# Patient Record
Sex: MALE | Race: ASIAN | NOT HISPANIC OR LATINO | URBAN - METROPOLITAN AREA
[De-identification: names, ages, dates, MRNs, and addresses within clinical notes are randomized per-mention and may not be internally consistent; named-entity substitution may affect disease eponyms.]

---

## 2017-09-04 ENCOUNTER — EMERGENCY (EMERGENCY)
Facility: HOSPITAL | Age: 42
LOS: 1 days | Discharge: HOME | End: 2017-09-04
Attending: EMERGENCY MEDICINE | Admitting: EMERGENCY MEDICINE
Payer: SELF-PAY

## 2017-09-04 VITALS
HEART RATE: 74 BPM | DIASTOLIC BLOOD PRESSURE: 77 MMHG | OXYGEN SATURATION: 96 % | TEMPERATURE: 98 F | RESPIRATION RATE: 16 BRPM | SYSTOLIC BLOOD PRESSURE: 110 MMHG

## 2017-09-04 VITALS
TEMPERATURE: 98 F | DIASTOLIC BLOOD PRESSURE: 74 MMHG | HEART RATE: 62 BPM | SYSTOLIC BLOOD PRESSURE: 101 MMHG | RESPIRATION RATE: 18 BRPM | OXYGEN SATURATION: 98 %

## 2017-09-04 PROCEDURE — 99283 EMERGENCY DEPT VISIT LOW MDM: CPT | Mod: 25

## 2017-09-04 PROCEDURE — 99053 MED SERV 10PM-8AM 24 HR FAC: CPT

## 2017-09-04 NOTE — ED PROVIDER NOTE - MEDICAL DECISION MAKING DETAILS
41M BIBEMS for EtOH intoxication.  Currently still acutely intoxicated.  Will observe and provide supportive care PRN.

## 2017-09-04 NOTE — ED ADULT NURSE REASSESSMENT NOTE - NS ED NURSE REASSESS COMMENT FT1
Patient money brought to security. Belongings placed in front of room 21. Patient money brought to security. Belongings placed in front of room 21. Unknown white grainy substance in small bottle found in patient's belongings. Security and ANM made aware. Security disposed of substance.

## 2017-09-04 NOTE — ED PROVIDER NOTE - NS ED ROS FT
limited by patient limited by patient's inebriation; endorsing reproducible chest pain and R hallux pain

## 2017-09-04 NOTE — ED PROVIDER NOTE - PROGRESS NOTE DETAILS
Sleeping but arousable. More awake but still intox. complains of chest pain which is reproducible with palpation. EKG was obtained without any ischemic changes. NSR otherwise. Found to have white powder in pocket, security called, they determined not cocaine or heroin. Drank a cup of water without issue. Still tired and not wanting to walk or leave. Will continue observation. ED Attending Khurram Mcmahon MD,   40y/o male signed out to me with C/C of ETOH use to be discharged when awake.  Pt now wakes up easily. No other complaints. Gollogly: Pt signed out to me. Patient reassessed: No complaints, vital signs normal, resting comfortably, A&Ox3, speech clear, gait normal. Pt is clinically sober.

## 2017-09-04 NOTE — ED PROVIDER NOTE - ATTENDING CONTRIBUTION TO CARE
DR. RAMIREZ, ATTENDING MD-  I performed a face to face bedside interview with patient regarding history of present illness, review of symptoms and past medical history. I completed an independent physical exam.  I have discussed patient's plan of care with the resident.   Documentation as above in the note.   HPI: 42 yo M with unknown Past Medical History that presents with alcohol intoxication, was found by EMS in [facility], admits to drinking alcohol tonight, denies any other ingestions. Denies fall, trauma, fever, chills.   EXAM: Intoxicated, head atraumatic, neck non tender, supple, FROM, no stepoffs, heart RRR, no M/R/G, chest wall normal and stable, no clavicular Fx, FROM in all 4 ext all warm and well perfused, pulses palpable and equal, abd soft nontender.   MDM: Most likely intox per pt report and benign physical exam. Will observe pending sobriety.

## 2017-09-04 NOTE — ED PROVIDER NOTE - OBJECTIVE STATEMENT
41M BIBEMS for EtOH intoxication after being found in a laundromat.  Patient complaining of chest pain that is aggravated by palpation and of a chronic, 3 year R hallux ulcer that is causing him pain.  Patient otherwise emotionally labile, alternating between laughing and crying and unable to provide history.  Had endorsed drinking 3 shots earlier today.

## 2017-09-04 NOTE — ED ADULT NURSE NOTE - OBJECTIVE STATEMENT
[facility] t[facility] ED R[facility]20 intoxicated[facility]. Pt admits to drinking 3 shots, not answering other questions, responding inappropriately to questions. In NAD, respirations even and unlabored. VSS. Urinal provided. Awaiting MD barber. Will continue to monitor. Pt presents tO ED R#20 intoxicated, brought in from [facility] where found on floor. Pt admits to drinking 3 shots, not answering other questions, responding inappropriately to questions. In NAD, respirations even and unlabored. VSS. Urinal provided. Awaiting MD barber. Will continue to monitor.  Received report from PM RN. Patient is resting comfortably with no complaints. Pt reevaluated by MD and discharged. Pt verbalizes understanding of discharge instructions. Belongings returned from cabinet by 21 and cash received from security. Pt has taken possession of all property and has left the area.   PANCHO Parker

## 2017-09-04 NOTE — ED PROVIDER NOTE - PHYSICAL EXAMINATION
PHYSICAL EXAM  GENERAL: NAD, thin, poor hygiene  HEAD:  Atraumatic, Normocephalic  EYES: EOMI, PERRLA, conjunctiva and sclera clear, no nystagmus  MOUTH: No tongue fasciculations  NECK: Supple, No JVD  CHEST/LUNG: Clear to auscultation bilaterally; No wheeze  HEART: Regular rate and rhythm; No murmurs, rubs, or gallops  ABDOMEN: Soft, Nontender, Nondistended; Bowel sounds present  EXTREMITIES:  B/L UE tremors, hips stable; 2+ Peripheral Pulses, No clubbing, cyanosis, or edema  PSYCH: labile  SKIN: proximal R hallux dry ulcer with TTP; No rashes

## 2017-09-04 NOTE — ED ADULT TRIAGE NOTE - CHIEF COMPLAINT QUOTE
Pt arrives to ED via EMS found pt in a laundromat.  Pt unsteady on feet.  Pt admits drinking this evening.  Pt tearful and then laughing in triage.  Pt pulled his pants down to begin urinating in the stretcher, urinal provided.

## 2018-04-04 ENCOUNTER — EMERGENCY (EMERGENCY)
Facility: HOSPITAL | Age: 43
LOS: 1 days | Discharge: HOME | End: 2018-04-04
Attending: EMERGENCY MEDICINE | Admitting: EMERGENCY MEDICINE
Payer: MEDICAID

## 2018-04-04 VITALS
TEMPERATURE: 98 F | RESPIRATION RATE: 18 BRPM | HEART RATE: 73 BPM | SYSTOLIC BLOOD PRESSURE: 123 MMHG | OXYGEN SATURATION: 98 % | DIASTOLIC BLOOD PRESSURE: 65 MMHG

## 2018-04-04 LAB
ALBUMIN SERPL ELPH-MCNC: 4.2 G/DL — SIGNIFICANT CHANGE UP (ref 3.3–5)
ALP SERPL-CCNC: 107 U/L — SIGNIFICANT CHANGE UP (ref 40–120)
ALT FLD-CCNC: 18 U/L — SIGNIFICANT CHANGE UP (ref 4–41)
APAP SERPL-MCNC: < 15 UG/ML — LOW (ref 15–25)
AST SERPL-CCNC: 29 U/L — SIGNIFICANT CHANGE UP (ref 4–40)
BASE EXCESS BLDV CALC-SCNC: 0.6 MMOL/L — SIGNIFICANT CHANGE UP
BASOPHILS # BLD AUTO: 0.07 K/UL — SIGNIFICANT CHANGE UP (ref 0–0.2)
BASOPHILS NFR BLD AUTO: 1.2 % — SIGNIFICANT CHANGE UP (ref 0–2)
BILIRUB SERPL-MCNC: < 0.2 MG/DL — LOW (ref 0.2–1.2)
BLOOD GAS VENOUS - CREATININE: 0.92 MG/DL — SIGNIFICANT CHANGE UP (ref 0.5–1.3)
BUN SERPL-MCNC: 18 MG/DL — SIGNIFICANT CHANGE UP (ref 7–23)
CALCIUM SERPL-MCNC: 8.7 MG/DL — SIGNIFICANT CHANGE UP (ref 8.4–10.5)
CHLORIDE BLDV-SCNC: 113 MMOL/L — HIGH (ref 96–108)
CHLORIDE SERPL-SCNC: 105 MMOL/L — SIGNIFICANT CHANGE UP (ref 98–107)
CK SERPL-CCNC: 300 U/L — HIGH (ref 30–200)
CO2 SERPL-SCNC: 25 MMOL/L — SIGNIFICANT CHANGE UP (ref 22–31)
CREAT SERPL-MCNC: 0.94 MG/DL — SIGNIFICANT CHANGE UP (ref 0.5–1.3)
EOSINOPHIL # BLD AUTO: 0.64 K/UL — HIGH (ref 0–0.5)
EOSINOPHIL NFR BLD AUTO: 10.7 % — HIGH (ref 0–6)
ETHANOL BLD-MCNC: 363 MG/DL — HIGH
GAS PNL BLDV: 145 MMOL/L — SIGNIFICANT CHANGE UP (ref 136–146)
GLUCOSE BLDV-MCNC: 96 — SIGNIFICANT CHANGE UP (ref 70–99)
GLUCOSE SERPL-MCNC: 101 MG/DL — HIGH (ref 70–99)
HCO3 BLDV-SCNC: 24 MMOL/L — SIGNIFICANT CHANGE UP (ref 20–27)
HCT VFR BLD CALC: 42.5 % — SIGNIFICANT CHANGE UP (ref 39–50)
HCT VFR BLDV CALC: 44 % — SIGNIFICANT CHANGE UP (ref 39–51)
HGB BLD-MCNC: 13.9 G/DL — SIGNIFICANT CHANGE UP (ref 13–17)
HGB BLDV-MCNC: 14.3 G/DL — SIGNIFICANT CHANGE UP (ref 13–17)
IMM GRANULOCYTES # BLD AUTO: 0 # — SIGNIFICANT CHANGE UP
IMM GRANULOCYTES NFR BLD AUTO: 0 % — SIGNIFICANT CHANGE UP (ref 0–1.5)
LACTATE BLDV-MCNC: 2.4 MMOL/L — HIGH (ref 0.5–2)
LYMPHOCYTES # BLD AUTO: 2.64 K/UL — SIGNIFICANT CHANGE UP (ref 1–3.3)
LYMPHOCYTES # BLD AUTO: 44.3 % — HIGH (ref 13–44)
MCHC RBC-ENTMCNC: 30.2 PG — SIGNIFICANT CHANGE UP (ref 27–34)
MCHC RBC-ENTMCNC: 32.7 % — SIGNIFICANT CHANGE UP (ref 32–36)
MCV RBC AUTO: 92.4 FL — SIGNIFICANT CHANGE UP (ref 80–100)
MONOCYTES # BLD AUTO: 0.34 K/UL — SIGNIFICANT CHANGE UP (ref 0–0.9)
MONOCYTES NFR BLD AUTO: 5.7 % — SIGNIFICANT CHANGE UP (ref 2–14)
NEUTROPHILS # BLD AUTO: 2.27 K/UL — SIGNIFICANT CHANGE UP (ref 1.8–7.4)
NEUTROPHILS NFR BLD AUTO: 38.1 % — LOW (ref 43–77)
NRBC # FLD: 0 — SIGNIFICANT CHANGE UP
PCO2 BLDV: 52 MMHG — HIGH (ref 41–51)
PH BLDV: 7.32 PH — SIGNIFICANT CHANGE UP (ref 7.32–7.43)
PLATELET # BLD AUTO: 210 K/UL — SIGNIFICANT CHANGE UP (ref 150–400)
PMV BLD: 9.1 FL — SIGNIFICANT CHANGE UP (ref 7–13)
PO2 BLDV: 63 MMHG — HIGH (ref 35–40)
POTASSIUM BLDV-SCNC: 3.9 MMOL/L — SIGNIFICANT CHANGE UP (ref 3.4–4.5)
POTASSIUM SERPL-MCNC: 4 MMOL/L — SIGNIFICANT CHANGE UP (ref 3.5–5.3)
POTASSIUM SERPL-SCNC: 4 MMOL/L — SIGNIFICANT CHANGE UP (ref 3.5–5.3)
PROT SERPL-MCNC: 7.5 G/DL — SIGNIFICANT CHANGE UP (ref 6–8.3)
RBC # BLD: 4.6 M/UL — SIGNIFICANT CHANGE UP (ref 4.2–5.8)
RBC # FLD: 13.1 % — SIGNIFICANT CHANGE UP (ref 10.3–14.5)
SALICYLATES SERPL-MCNC: < 5 MG/DL — LOW (ref 15–30)
SAO2 % BLDV: 85.3 % — HIGH (ref 60–85)
SODIUM SERPL-SCNC: 144 MMOL/L — SIGNIFICANT CHANGE UP (ref 135–145)
WBC # BLD: 5.96 K/UL — SIGNIFICANT CHANGE UP (ref 3.8–10.5)
WBC # FLD AUTO: 5.96 K/UL — SIGNIFICANT CHANGE UP (ref 3.8–10.5)

## 2018-04-04 PROCEDURE — 99284 EMERGENCY DEPT VISIT MOD MDM: CPT

## 2018-04-04 PROCEDURE — 70450 CT HEAD/BRAIN W/O DYE: CPT | Mod: 26

## 2018-04-04 PROCEDURE — 72125 CT NECK SPINE W/O DYE: CPT | Mod: 26

## 2018-04-04 RX ORDER — SODIUM CHLORIDE 9 MG/ML
1000 INJECTION, SOLUTION INTRAVENOUS
Refills: 0 | Status: DISCONTINUED | OUTPATIENT
Start: 2018-04-04 | End: 2018-04-08

## 2018-04-04 RX ORDER — SODIUM CHLORIDE 9 MG/ML
1000 INJECTION INTRAMUSCULAR; INTRAVENOUS; SUBCUTANEOUS ONCE
Refills: 0 | Status: COMPLETED | OUTPATIENT
Start: 2018-04-04 | End: 2018-04-04

## 2018-04-04 RX ADMIN — SODIUM CHLORIDE 1000 MILLILITER(S): 9 INJECTION INTRAMUSCULAR; INTRAVENOUS; SUBCUTANEOUS at 21:59

## 2018-04-04 RX ADMIN — SODIUM CHLORIDE 200 MILLILITER(S): 9 INJECTION, SOLUTION INTRAVENOUS at 22:35

## 2018-04-04 NOTE — ED PROVIDER NOTE - MEDICAL DECISION MAKING DETAILS
41 YOM unclear PMH found hugging a tree intoxicated BIB EMS.  VSS WNL.  Given presentation, evaluate for intoxication, head and neck trauma although no findings consistent with traumatic injury.  Rhabdo, treat for Wernicke's/Korsikoff syndrome, reassess.  No hypoglycemia today.

## 2018-04-04 NOTE — ED PROVIDER NOTE - PHYSICAL EXAMINATION
No obvious skull fracture, depression, hematoma, ecchymosis,  or signs of head trauma.  No palpable skull tenderness or deformity.  No barnes sign, raccoon eyes, CSF rhinorrhea, CSF otorrhea, hematotympanum, or other sign of basilar skull fracture.  No maxillary or facial ecchymosis, hematoma, deformity, or palpable tenderness.   No septal hematoma. No midline cervical, thoracic, or lumbar tenderness, step off, fracture, or deformity.  No sign of thoracic trama.  No sign of abdominal or pelvic trauma.  No sign of extremity trauma.

## 2018-04-04 NOTE — ED ADULT NURSE NOTE - CHIEF COMPLAINT QUOTE
pt found on sidewalk by bystanders.  as per ems  "he was hugging the tree and couldn't stand up."  ems reports + alcohol on breath.  Pt admits to drinking "too much today."  pt opens eyes to name call.  crying at intervals.

## 2018-04-04 NOTE — ED PROVIDER NOTE - ATTENDING CONTRIBUTION TO CARE
MD Garcia:  I performed a face to face bedside interview with patient regarding history of present illness, review of symptoms and past medical history. I completed an independent physical exam(documented below).  I have discussed patient's plan of care with resident.   I agree with note as stated above, having amended the EMR as needed to reflect my findings. I have discussed the assessment and plan of care.  This includes during the time I functioned as the attending physician for this patient.  PE:  Gen: somnolent, smells of alcohol  Head: NC, AT,  EOMI, normal lids/conjunctiva  Neck: +supple, no tenderness/meningismus/JVD, +Trachea midline  Chest: no chest wall tenderness, equal chest rise  Pulm: Bilateral BS, normal resp effort, no wheeze/stridor/retractions  CV: RRR, no M/R/G, +dist pulses  Abd: soft, NT/ND, +BS, no hepatosplenomegaly  Rectal: deferred  Mskel: no edema/erythema/cyanosis  Skin: no rash  Neuro: somnolent, oriented to X1 (self), follows some basic commands) but doses off to sleep frequently.  MDM:  40yo M bibems for ?public intoxication. Per report, EMS was called by bystanders whom found pt on a sidewalk "hugging a tree", smells of alcohol. Here, pt somnolent but arousable to light touch, stating "I drink too much", denying pain but does not provide more information regarding possible recent trauma/falls, etc. No gross injuries noted, but given story of pt found down on ground, smells of etoh here, poor historian, will have to get labs and imaging to r/o traumatic injury.

## 2018-04-04 NOTE — ED ADULT TRIAGE NOTE - CHIEF COMPLAINT QUOTE
pt found on sidewalk by bystanders.  as per ems  "he was hugging the tree and couldn't stand up."  ems reports + alcohol on breath.  Pt admits to drinking "too much today."  pt opens eyes to name call.  not answering questions. pt found on sidewalk by bystanders.  as per ems  "he was hugging the tree and couldn't stand up."  ems reports + alcohol on breath.  Pt admits to drinking "too much today."  pt opens eyes to name call.  crying at intervals.

## 2018-04-04 NOTE — ED ADULT NURSE NOTE - OBJECTIVE STATEMENT
pt presents to room 11, intermittently crying, admits to drinking today, would not answer how much. denies any pain, shortness of breath, or fevers. would not answer any other questions. belongings secured in . pt with alcohol on breath, unknown hx. waiting for md barber. Will continue to monitor.

## 2018-04-04 NOTE — ED PROVIDER NOTE - OBJECTIVE STATEMENT
41 YOM unclear PMH found hugging a tree intoxicated BIB EMS.  Pt awake alert responding to stimuli refusing to cooperate.  No other history provided. PT unwilling to complete ROS or provide additional history today.

## 2018-04-04 NOTE — ED PROVIDER NOTE - PROGRESS NOTE DETAILS
JW Pt now ambulatory clinically sober.  Pt now AOx3 NAD.  PT denies any injuries hungry tolerating PO in the ED.  CT head and cervical spine WNL.  Repeat examination unchanged.  PT instructed to decrease alcohol intake and instructed to follow up with MD in the next 24 hours.  I reviewed the alarm symptoms of this patient's diagnosis and discussed criteria for their return to the emergency department.  I instructed the patient to return to the emergency department with any alarm symptoms for their specific diagnosis including headache, nausea, vomiting, any worsening symptoms, and any other concerns.  I instructed this patient to call their primary doctor today, to inform them of their visit to the emergency department, and to obtain a repeat evaluation in the next 24 hours.  This patient understood and agreed with our plan for follow up and felt safe returning home.  At the time of discharge this patient remained in stable condition, in no acute distress, with stable vital signs.

## 2018-04-05 VITALS
OXYGEN SATURATION: 99 % | DIASTOLIC BLOOD PRESSURE: 64 MMHG | HEART RATE: 81 BPM | TEMPERATURE: 98 F | RESPIRATION RATE: 15 BRPM | SYSTOLIC BLOOD PRESSURE: 123 MMHG

## 2018-04-05 RX ADMIN — SODIUM CHLORIDE 200 MILLILITER(S): 9 INJECTION, SOLUTION INTRAVENOUS at 00:21

## 2018-04-05 RX ADMIN — SODIUM CHLORIDE 200 MILLILITER(S): 9 INJECTION, SOLUTION INTRAVENOUS at 03:34

## 2018-04-13 NOTE — ED PROVIDER NOTE - NS ED ATTENDING STATEMENT MOD
Height obtained from patient: 5'0", BMI= 20.7, normal weight for height I have personally seen and examined this patient.  I have fully participated in the care of this patient. I have reviewed all pertinent clinical information, including history, physical exam, plan and the Resident’s note and agree except as noted.

## 2020-03-10 NOTE — ED ADULT TRIAGE NOTE - PAIN RATING/NUMBER SCALE (0-10): REST
Discussed PA uncertain of exact etiology of rash, especially now since rash is resolved. Discussed possible ddxs including phytophotodermatitis, derm unspecified, and porphyria. If rash were to recur, RTC the same day for a punch bx to help determine dx. \\n\\nSince pt still has some follicular prominence and flakiness, gave pt CeraVe SA Renewing lotion. Detail Level: Simple 0

## 2020-05-04 NOTE — ED PROVIDER NOTE - GASTROINTESTINAL, MLM
I counseled the patient to follow up with PCP.    Pt has an appointment with pulmonary today.   
Abdomen soft, non-tender, no guarding.
Affect and characteristics of appearance, verbalizations, behaviors are appropriate

## 2021-03-18 ENCOUNTER — EMERGENCY (EMERGENCY)
Facility: HOSPITAL | Age: 46
LOS: 1 days | Discharge: HOME | End: 2021-03-18
Attending: EMERGENCY MEDICINE
Payer: SELF-PAY

## 2021-03-18 VITALS
TEMPERATURE: 98 F | OXYGEN SATURATION: 97 % | HEART RATE: 80 BPM | RESPIRATION RATE: 17 BRPM | DIASTOLIC BLOOD PRESSURE: 72 MMHG | SYSTOLIC BLOOD PRESSURE: 113 MMHG

## 2021-03-18 VITALS
TEMPERATURE: 98 F | SYSTOLIC BLOOD PRESSURE: 112 MMHG | OXYGEN SATURATION: 96 % | DIASTOLIC BLOOD PRESSURE: 71 MMHG | HEART RATE: 70 BPM | RESPIRATION RATE: 17 BRPM

## 2021-03-18 LAB
ALBUMIN SERPL ELPH-MCNC: 3.4 G/DL — LOW (ref 3.5–5)
ALP SERPL-CCNC: 97 U/L — SIGNIFICANT CHANGE UP (ref 40–120)
ALT FLD-CCNC: 12 U/L DA — SIGNIFICANT CHANGE UP (ref 10–60)
ANION GAP SERPL CALC-SCNC: 8 MMOL/L — SIGNIFICANT CHANGE UP (ref 5–17)
AST SERPL-CCNC: 35 U/L — SIGNIFICANT CHANGE UP (ref 10–40)
BASOPHILS # BLD AUTO: 0.04 K/UL — SIGNIFICANT CHANGE UP (ref 0–0.2)
BASOPHILS NFR BLD AUTO: 0.9 % — SIGNIFICANT CHANGE UP (ref 0–2)
BILIRUB SERPL-MCNC: 0.2 MG/DL — SIGNIFICANT CHANGE UP (ref 0.2–1.2)
BUN SERPL-MCNC: 15 MG/DL — SIGNIFICANT CHANGE UP (ref 7–18)
CALCIUM SERPL-MCNC: 8.8 MG/DL — SIGNIFICANT CHANGE UP (ref 8.4–10.5)
CHLORIDE SERPL-SCNC: 111 MMOL/L — HIGH (ref 96–108)
CO2 SERPL-SCNC: 26 MMOL/L — SIGNIFICANT CHANGE UP (ref 22–31)
CREAT SERPL-MCNC: 0.95 MG/DL — SIGNIFICANT CHANGE UP (ref 0.5–1.3)
EOSINOPHIL # BLD AUTO: 0.32 K/UL — SIGNIFICANT CHANGE UP (ref 0–0.5)
EOSINOPHIL NFR BLD AUTO: 6.9 % — HIGH (ref 0–6)
ETHANOL SERPL-MCNC: 422 MG/DL — HIGH (ref 0–10)
GLUCOSE SERPL-MCNC: 94 MG/DL — SIGNIFICANT CHANGE UP (ref 70–99)
HCT VFR BLD CALC: 40 % — SIGNIFICANT CHANGE UP (ref 39–50)
HGB BLD-MCNC: 13.2 G/DL — SIGNIFICANT CHANGE UP (ref 13–17)
IMM GRANULOCYTES NFR BLD AUTO: 0.2 % — SIGNIFICANT CHANGE UP (ref 0–1.5)
LYMPHOCYTES # BLD AUTO: 1.65 K/UL — SIGNIFICANT CHANGE UP (ref 1–3.3)
LYMPHOCYTES # BLD AUTO: 35.4 % — SIGNIFICANT CHANGE UP (ref 13–44)
MAGNESIUM SERPL-MCNC: 2.1 MG/DL — SIGNIFICANT CHANGE UP (ref 1.6–2.6)
MCHC RBC-ENTMCNC: 29.7 PG — SIGNIFICANT CHANGE UP (ref 27–34)
MCHC RBC-ENTMCNC: 33 GM/DL — SIGNIFICANT CHANGE UP (ref 32–36)
MCV RBC AUTO: 89.9 FL — SIGNIFICANT CHANGE UP (ref 80–100)
MONOCYTES # BLD AUTO: 0.32 K/UL — SIGNIFICANT CHANGE UP (ref 0–0.9)
MONOCYTES NFR BLD AUTO: 6.9 % — SIGNIFICANT CHANGE UP (ref 2–14)
NEUTROPHILS # BLD AUTO: 2.32 K/UL — SIGNIFICANT CHANGE UP (ref 1.8–7.4)
NEUTROPHILS NFR BLD AUTO: 49.7 % — SIGNIFICANT CHANGE UP (ref 43–77)
NRBC # BLD: 0 /100 WBCS — SIGNIFICANT CHANGE UP (ref 0–0)
PLATELET # BLD AUTO: 246 K/UL — SIGNIFICANT CHANGE UP (ref 150–400)
POTASSIUM SERPL-MCNC: 3.8 MMOL/L — SIGNIFICANT CHANGE UP (ref 3.5–5.3)
POTASSIUM SERPL-SCNC: 3.8 MMOL/L — SIGNIFICANT CHANGE UP (ref 3.5–5.3)
PROT SERPL-MCNC: 7.9 G/DL — SIGNIFICANT CHANGE UP (ref 6–8.3)
RBC # BLD: 4.45 M/UL — SIGNIFICANT CHANGE UP (ref 4.2–5.8)
RBC # FLD: 14.9 % — HIGH (ref 10.3–14.5)
SODIUM SERPL-SCNC: 145 MMOL/L — SIGNIFICANT CHANGE UP (ref 135–145)
WBC # BLD: 4.66 K/UL — SIGNIFICANT CHANGE UP (ref 3.8–10.5)
WBC # FLD AUTO: 4.66 K/UL — SIGNIFICANT CHANGE UP (ref 3.8–10.5)

## 2021-03-18 PROCEDURE — 83735 ASSAY OF MAGNESIUM: CPT

## 2021-03-18 PROCEDURE — 70450 CT HEAD/BRAIN W/O DYE: CPT | Mod: 26,MA

## 2021-03-18 PROCEDURE — 36415 COLL VENOUS BLD VENIPUNCTURE: CPT

## 2021-03-18 PROCEDURE — 85025 COMPLETE CBC W/AUTO DIFF WBC: CPT

## 2021-03-18 PROCEDURE — 80053 COMPREHEN METABOLIC PANEL: CPT

## 2021-03-18 PROCEDURE — 80307 DRUG TEST PRSMV CHEM ANLYZR: CPT

## 2021-03-18 PROCEDURE — 70450 CT HEAD/BRAIN W/O DYE: CPT

## 2021-03-18 PROCEDURE — 82962 GLUCOSE BLOOD TEST: CPT

## 2021-03-18 PROCEDURE — 99285 EMERGENCY DEPT VISIT HI MDM: CPT | Mod: 25

## 2021-03-18 PROCEDURE — 99285 EMERGENCY DEPT VISIT HI MDM: CPT

## 2021-03-18 RX ORDER — THIAMINE MONONITRATE (VIT B1) 100 MG
100 TABLET ORAL ONCE
Refills: 0 | Status: COMPLETED | OUTPATIENT
Start: 2021-03-18 | End: 2021-03-18

## 2021-03-18 RX ORDER — SODIUM CHLORIDE 9 MG/ML
1000 INJECTION INTRAMUSCULAR; INTRAVENOUS; SUBCUTANEOUS ONCE
Refills: 0 | Status: COMPLETED | OUTPATIENT
Start: 2021-03-18 | End: 2021-03-18

## 2021-03-18 RX ADMIN — SODIUM CHLORIDE 1000 MILLILITER(S): 9 INJECTION INTRAMUSCULAR; INTRAVENOUS; SUBCUTANEOUS at 18:21

## 2021-03-18 NOTE — ED PROVIDER NOTE - PATIENT PORTAL LINK FT
You can access the FollowMyHealth Patient Portal offered by [facility] by registering at the following website: [email or web address]. By joining [facility]’s FollowMyHealth portal, you will also be able to view your health information using other applications (apps) compatible with our system.

## 2021-03-18 NOTE — ED PROVIDER NOTE - PHYSICAL EXAMINATION
Afebrile, hemodynamically stable, saturating well  NAD, nontoxic appearing, laying comfortably in bed  Alcohol smell  Head NCAT  EOMI grossly, anicteric  MM dry  RRR, nml S1/S2, no m/r/g  Lungs CTAB, no w/r/r  Abd soft, NT, ND, nml BS, no rebound or guarding  Alert, oriented x3, CN's 3-12 grossly intact, MILLIGAN spontaneously, slurred speech  No leg cyanosis or edema, noted R big toe ulceration which appears chronic, no TTP, discharge, or surrounding discoloration  Skin warm, well perfused, no rashes or hives

## 2021-03-18 NOTE — ED PROVIDER NOTE - OBJECTIVE STATEMENT
45yoM presents with AMS. Per EMS the pt called them from the street, on arrival he appeared intoxicated and smelled of alcohol and asked them if they had any medicine for him for his R big toe which has an ulcer on it. Pt himself at this time states he speaks English, interviewed in English and Rebekah with KITTY Vasquez, is unable to state why he is here. Confirms alcohol use, states his ulcer has been there for more than a yr. Denies fall or specific symptoms.

## 2021-03-18 NOTE — ED PROVIDER NOTE - CLINICAL SUMMARY MEDICAL DECISION MAKING FREE TEXT BOX
Appearance of chronic toe ulcer, no e/o infection. No e/o trauma on full body exam. No focal deficits to suggest ACS. No fever. No ICH _____. No gross electrolyte abnormalities. Presentation c/w alcohol intoxication which he confirms use of today. Appearance of chronic toe ulcer, no e/o infection. No e/o trauma on full body exam. No focal deficits to suggest ACS. No fever. No ICH. No gross electrolyte abnormalities. Presentation c/w alcohol intoxication which he confirms use of today. Patient is well appearing, NAD, afebrile, hemodynamically stable. Improving alertness however will give some more time. Signed off care to Dr. ERIC Maloney who will reassess. Appearance of chronic toe ulcer, no e/o infection. No e/o trauma on full body exam. No focal deficits to suggest ACS. No fever. No ICH. No gross electrolyte abnormalities. Presentation c/w alcohol intoxication which he confirms use of today. Patient is well appearing, NAD, afebrile, hemodynamically stable. Improving alertness however will give some more time. Signed off care to Dr. ERIC Maloney who will reassess.    Haider [id number]:  S/o from Dr Chung pending clinical sobriety. Pt has woken spontaneously and is requesting food and ambulating steadily w/o assistance. Pt requesting d/c after food. Pt is well appearing with a reassuring exam. Discharge home with PMD f/u within 5 days.

## 2021-03-18 NOTE — ED PROVIDER NOTE - NSFOLLOWUPINSTRUCTIONS_ED_ALL_ED_FT
You were seen in the emergency room today for alcohol intoxication.    Please call your primary doctor to inform them of this ER visit and obtain the next available appointment within the next 5 days. As we discussed, return to the ER if you have any worsening symptoms.    We no longer feel that you need further emergency care or admission to the hospital at this time.    While we have determined that you are currently stable for discharge, we know that things can change. Please seek immediate medical attention or return to the ER if you experience any of the following:  Any worsening or persistent symptoms  Severe Pain  Chest Pain  Difficulty Breathing  Bleeding  Passing Out  Severe Rash  Inability to Eat or Drink  Persistent Fever    Please see a primary care doctor or specialist within 5 days to ensure that you are improving.    Please call the [facility] phone numbers on this document if you have any problems obtaining a follow up appointment.    I wish you well! -Dr Menendez

## 2021-03-18 NOTE — ED ADULT NURSE NOTE - OBJECTIVE STATEMENT
Pt BIBA for possible alcohol intoxication. Pt. found on the street and smells like alcohol. Pt. has a wound on big toe. Unable to get information from pt.

## 2021-05-04 NOTE — ED ADULT TRIAGE NOTE - NS ED NOTE AC HIGH RISK COUNTRIES
Update History & Physical    The patient's History and Physical of April 29, 2021 was reviewed with the patient and I examined the patient. There was no change. The surgical site was confirmed by the patient and me. Plan: The risks, benefits, expected outcome, and alternative to the recommended procedure have been discussed with the patient. Patient understands and wants to proceed with the procedure.      Electronically signed by Vianey Weber MD on 5/4/2021 at 7:16 AM No

## 2022-06-23 ENCOUNTER — EMERGENCY (EMERGENCY)
Facility: HOSPITAL | Age: 47
LOS: 1 days | Discharge: LEFT BEFORE DISCHARGE | End: 2022-06-23
Attending: EMERGENCY MEDICINE
Payer: SELF-PAY

## 2022-06-23 VITALS
OXYGEN SATURATION: 97 % | RESPIRATION RATE: 20 BRPM | TEMPERATURE: 98 F | DIASTOLIC BLOOD PRESSURE: 79 MMHG | SYSTOLIC BLOOD PRESSURE: 122 MMHG | HEART RATE: 108 BPM

## 2022-06-23 PROCEDURE — L9991: CPT

## 2023-03-21 NOTE — ED ADULT NURSE NOTE - NS ED NURSE RECORD ANOTHER HT AND WT
Yes Detail Level: Detailed Quality 130: Documentation Of Current Medications In The Medical Record: Current Medications Documented Quality 110: Preventive Care And Screening: Influenza Immunization: Influenza Immunization not Administered because Patient Refused. Quality 431: Preventive Care And Screening: Unhealthy Alcohol Use - Screening: Patient not identified as an unhealthy alcohol user when screened for unhealthy alcohol use using a systematic screening method Quality 226: Preventive Care And Screening: Tobacco Use: Screening And Cessation Intervention: Patient screened for tobacco use and is an ex/non-smoker

## 2023-10-17 NOTE — ED ADULT TRIAGE NOTE - DOMESTIC TRAVEL HIGH RISK QUESTION
No Posterior Auricular Interpolation Flap Text: Due to location on free margin and exposed cartilage and to restore structure and function, a decision was made to reconstruct the defect utilizing an interpolation axial flap and a staged reconstruction.  A telfa template was made of the defect.  This telfa template was then used to outline the posterior auricular interpolation flap.  The donor area for the pedicle flap was then injected with anesthesia.  The flap was excised through the skin and subcutaneous tissue down to the layer of the underlying musculature.  The pedicle flap was carefully excised within this deep plane to maintain its blood supply.  The edges of the donor site were undermined.   The donor site was closed in a primary fashion.  The pedicle was then rotated into position and sutured.  Once the tube was sutured into place, adequate blood supply was confirmed with blanching and refill.  The pedicle was then wrapped with xeroform gauze and dressed appropriately with a telfa and gauze bandage to ensure continued blood supply and protect the attached pedicle.

## 2024-02-05 NOTE — ED ADULT NURSE NOTE - NS ED NURSE RECORD ANOTHER VITAL SIGN
I have reviewed discharge instructions with the patient.  The patient verbalized understanding.    Patient left ED via Discharge Method: ambulatory to Home with self    Opportunity for questions and clarification provided.       Patient given 0 scripts.         To continue your aftercare when you leave the hospital, you may receive an automated call from our care team to check in on how you are doing.  This is a free service and part of our promise to provide the best care and service to meet your aftercare needs.” If you have questions, or wish to unsubscribe from this service please call [phone].  Thank you for Choosing our [facility] Emergency Department.      Yes, record another set of vital signs

## 2024-06-18 NOTE — ED ADULT NURSE NOTE - NS ED NOTE  TALK SOMEONE YN
CLINICAL NUTRITION SERVICES - PEDIATRIC ASSESSMENT NOTE    REASON FOR ASSESSMENT  Pily Lange is a 5 year old male seen by the dietitian in CF clinic for annual nutrition visit. Patient is accompanied by mother.     RECOMMENDATIONS    Recheck fecal elastase with annual labs per family preference.  Go up on enzymes of 3 with meals and 1-2 with snacks to provide 2400 units/kg/meal.   Drop to 1 can of Peptamen Jr 1.5 overnight with 1 cartridge of relizorb with weight check in 6 weeks via [employer].       ANTHROPOMETRICS  Height/Length: 100.1 cm, -0.60 z score  Weight: 15 kg, -0.75 z score  BMI: 14.97 kg/m^2, -0.61 z score    Comments: height and weight trending, BMI appropriate for age/trending but below CFF goal of >50%ile for age.     NUTRITION HISTORY  Pily is on a High Kcal/protein diet at home with supplemental tube feeds. He is trying new feeds and eating more foods. Family would like to trial cutting back on tube feeds to see if he can better meet needs via foods.      PERT Regimen: 2 capsules of Creon 10080 with meals and 1 capsules with snacks to provide 1600 units of lipase/kg/meal    GI:  Stools: loose/oily    Home Regimen:  Route: G-tube  DME: [facility], formula from [facility]  Formula: Peptamen Jr 1.5  Quantity/Volume: 2 cartons/night  Rate: 90 mL/hr   Enzymes: 1 cartridge Relizorb  Provides 500 mL, (33 mL/kg), 750 kcal, (50 kcal/kg), 23 g protein, (1.5 g/kg), 11.4 mcg/d Vitamin D, and 10.6 mg/d Iron.   Meets 46% of kcal and 94% protein needs.    LABS Reviewed; annual labs at next visit    MEDICATIONS Reviewed    ASSESSED NUTRITION NEEDS  Based on RDA/age: 90 kcal/kg and 1.1 g/kg of protein  Estimated Energy Needs: 108-135 kcal/kg (RDA x 1.2-1.5 )  Estimated Protein Needs: 1.6-2.2 g/kg (RDA x 1.5-2)   Estimated Fluid Needs: 1250 mL (maintenance) or per MD  Micronutrient Needs: per annual labs/CF guidelines    NUTRITION STATUS VALIDATION  Patient does not meet criteria for malnutrition at this time.    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     INTERVENTIONS  Nutrition Prescription  [place] to meet 100% of estimated needs through PO intake and supplemental tube feeds and enzyme therapy     Nutrition Education  RDN reviewed nutrition history and weight trends since last RDN visit. Reviewed recommendations above and family to reach out if enzyme dose does not help symptoms.     Implementation  1. Collaboration / referral to other provider: Discussed nutritional plan of care with CF team.  2. Changes in supplementation per annual nutrition labs.  3. Provided with RD contact information and encouraged follow-up as needed.    Goals  Age appropriate weight gain/linear growth.   PO with supplemental nutrition support to meet 100% of estimated needs.   Enzyme and vitamin therapy compliance.     FOLLOW UP/MONITORING  Will continue to monitor progress towards goals and provide nutrition education as needed.    Spent 5 minutes in consult with Luchoazh and mother.    Janette Triana MS, RDN, LDN  Pediatric Cystic Fibrosis & Pulmonary Dietitian  [facility]  Phone #[phone]   No

## 2025-06-20 NOTE — ED ADULT NURSE NOTE - HEART RATE (BEATS/MIN)
Patient: RUTHY CAGE    MR# [id number]    Time of Service: 6/20/2025  4:00 PM  Chief Complaint   Patient presents with    Abdominal Pain       History of Present Illness  Patient is a 79-year-old male with a past medical history of type 2 diabetes, hypertension presenting to the emergency department with a chief complaint of abdominal pain.  Patient states that he started developing pressure and pain in his lower abdomen.  He states that it is only when someone touches his abdomen or when he bends forward.  He did have lumbar surgery 10 days prior.  He states that he has been otherwise progressing normally.  He does have a Hankins catheter but is passing urine.  Denies any associated fever, chills, chest pain, shortness of breath, nausea, vomiting, diarrhea, dysuria, hematuria, bloody stool.    Review of Systems  All systems reviewed and are negative unless documented in the HPI.     Past Medical/Surgical History  Past Medical History:   Diagnosis Date    Adrenal crisis  (CMD)     Arthritis, rheumatoid (CMD)     Diabetes mellitus  (CMD)     Type 2    Diabetic ulcer of toe of right foot  (CMD) 12/24/2024    HTN (hypertension)     Lymphedema     Sleep apnea      Past Surgical History:   Procedure Laterality Date    Adrenalectomy      Parathyroidectomy      Retinal detachment surgery Left     Tendon repair Left        Medication list on file  Current Outpatient Medications   Medication Instructions    acetaminophen (TYLENOL) 1,000 mg, Oral, EVERY 8 HOURS PRN    amLODIPine (NORVASC) 5 mg, Oral, DAILY    apixaBAN (ELIQUIS) 2.5 mg, Oral, EVERY 12 HOURS SCHEDULED    Azilsartan Medoxomil 80 mg, Oral, NIGHTLY    blood glucose lancets Test blood sugar 4 times daily. Diagnosis: Type 2 Diabetes Mellitus.    blood glucose meter Test blood sugar 4 times daily. Diagnosis: Type 2 Diabetes Mellitus.    blood glucose test strip Test blood sugar 4 times daily. Diagnosis: Type 2 Diabetes Mellitus. Meter: Compatible with current  glucometer or meter prescription    calcium (OYST-RACHEL) 500 MG tablet 1 tablet, Oral, DAILY    calcium carbonate (TUMS) 1,000 mg, Oral, EVERY 4 HOURS PRN    cyclobenzaprine (FLEXERIL) 5 mg, Oral, 3 TIMES DAILY PRN, For up to 10 days in the morning, noon and at bedtime.    dicyclomine (BENTYL) 20 mg, Oral, 4 TIMES DAILY BEFORE MEALS & NIGHTLY    docusate sodium-sennosides (SENOKOT S) 50-8.6 MG per tablet 1 tablet, Oral, DAILY    ezetimibe (ZETIA) 10 mg, Oral, AT BEDTIME    fluticasone (FLONASE) 50 MCG/ACT nasal spray 1 spray, Nasal, PRN    furosemide (LASIX) 20 mg, Oral, DAILY    Garlic 100 mg, Oral, DAILY    Gemtesa 75 mg, Oral, DAILY    HumuLIN R U-500 KwikPen  Units, Subcutaneous, 3 TIMES DAILY BEFORE MEALS    ipratropium-albuterol (DUONEB) 0.5-2.5 (3) MG/3ML nebulizer solution 3 mLs, Inhalation, EVERY 6 HOURS PRN    lidocaine (LIDODERM) 5 % patch 2 patches, Transdermal, EVERY 24 HOURS, Remove patch 12 hours after applying- back    metoCLOPramide (REGLAN) 5 mg, Oral, 3 TIMES DAILY PRN    Multiple Vitamins-Minerals (ICAPS AREDS 2 PO) 1 tablet, Oral, 2 times daily    naLOXone (NARCAN) 4 MG/0.1ML nasal liquid Spray the content of 1 device into 1 nostril. Call 911. May repeat with 2nd device in alternate nostril if no response in 2-3 minutes.    ondansetron (ZOFRAN ODT) 4 mg, Translingual, EVERY 8 HOURS PRN    oxyCODONE (IMM REL) (ROXICODONE) 5 mg, Oral, EVERY 6 HOURS PRN, For up to 7 days    oxyCODONE (IMM REL) (ROXICODONE) 2.5 mg, Oral, EVERY 4 HOURS PRN    simethicone (MYLICON) 125 mg, Oral, 4 TIMES DAILY PRN    tamsulosin (FLOMAX) 0.8 mg, Oral, AT BEDTIME    traMADol (ULTRAM) 50 mg, Oral, EVERY 6 HOURS PRN    Vitamin D 50 mcg, Oral, DAILY       Allergies  ALLERGIES:   Allergen Reactions    Beta Adrenergic Blockers DIZZINESS    Carvedilol Other (See Comments)     Extreme Fatigue and Weakness.  Loss of Balance    Gadolinium Other (See Comments)     agitation    Hydralazine Other (See Comments)     Chest pain     Quinolones Other (See Comments)     Pt was told to not take any Quinolones as a precaution due to a spontaneous achilles rupture (which did NOT happen from quinolone use).    Statins MYALGIA    Pears [Pear   (Food Or Med)] GI UPSET       Family/Social History/Social Determinants of Health  Family History   Problem Relation Age of Onset    Diabetes Mother     Heart disease Father     Diabetes Sister        Social History     Tobacco Use    Smoking status: Former     Types: Cigarettes     Passive exposure: Never    Smokeless tobacco: Never   Vaping Use    Vaping status: never used   Substance Use Topics    Alcohol use: Yes     Comment: social    Drug use: Never       Social Drivers of Health     Feeling safe in your relationship: Low Risk  (5/31/2025)    Interpersonal Safety     How often physically hurt: Never     How often insulted or talked down to: Never     How often threatened with harm: Never     How often scream or curse at: Never   Social Connections: High Risk (5/31/2025)    Social Connections     Social Connectivity: Less than once a week   Alcohol Use: Not At Risk (5/31/2025)    Alcohol Use     Total Audit-C Score: 0   Tobacco Use: Medium Risk (6/20/2025)    Patient History     Smoking Tobacco Use: Former     Smokeless Tobacco Use: Never     Passive Exposure: Never   Financial Resource Strain: Low Risk  (5/31/2025)    Financial Resource Strain     Unable to Get: None   Stress: Not on file   Physical Activity: Not on file   Food Insecurity: Low Risk  (5/31/2025)    Food Insecurity     Worried about Food: Never true     Food is Gone: Never true   Transportation Needs: Not At Risk (5/31/2025)    Transportation Needs     Lack of Reliable Transportation: No   Living Situation: Low Risk  (5/31/2025)    Inadequate Housing     Living Situation: I have a steady place to live     Housing Problems: None of the above   Depression: Not at risk (5/31/2025)    PHQ-2     PHQ-2 Score: 0   Utilities: Not At Risk (5/31/2025)     Utilities     Threatened with loss of utilities: No        Physical Exam  Vitals:    06/20/25 1700 06/20/25 1752 06/20/25 1822 06/20/25 2111   BP: 136/65 112/65 91/78 127/52   Pulse: 87 79 72 79   Resp: 19 18 18   Temp:       TempSrc:       SpO2: 97% 97% 98% 99%   Weight:           General Appearance: No acute distress  HEENT: NCAT, airway normal and patent, mucus membranes moist  Lungs: Clear to auscultation bilaterally. No crackles, rhonchi, or wheezes.  Heart: Regular rate, regular rhythm. No murmurs, rubs, or gallops  Abdomen: Soft, mild tenderness to palpation of lower abdomen, non-distended. No guarding, rebound tenderness, or rigidity. No CVA tenderness, Hankins catheter in place draining clear urine  Extremities: No pitting edema, peripheral pulses intact bilaterally  Neurological: Alert and oriented x3, No acute deficits  Mental Status: Cooperative.  Musculoskeletal: Full ROM of all extremities   Skin: Warm and dry. No skin disruptions, no rashes.    Laboratory summary     Results for orders placed or performed during the hospital encounter of 06/20/25   Comprehensive Metabolic Panel    Specimen: Blood, Venous   Result Value Ref Range    Fasting Status      Sodium 134 (L) 135 - 145 mmol/L    Potassium 4.3 3.4 - 5.1 mmol/L    Chloride 100 97 - 110 mmol/L    Carbon Dioxide 32 21 - 32 mmol/L    Anion Gap 6 (L) 7 - 19 mmol/L    Glucose 78 70 - 99 mg/dL    BUN 30 (H) 6 - 20 mg/dL    Creatinine 1.24 (H) 0.67 - 1.17 mg/dL    Glomerular Filtration Rate 60 >=60    BUN/Cr 24 7 - 25    Calcium 8.3 (L) 8.4 - 10.2 mg/dL    Bilirubin, Total 1.0 0.2 - 1.0 mg/dL    GOT/AST 37 <=37 Units/L    GPT/ALT 58 <64 Units/L    Alkaline Phosphatase 121 (H) 45 - 117 Units/L    Albumin 2.1 (L) 3.4 - 5.0 g/dL    Protein, Total 4.9 (L) 6.4 - 8.2 g/dL    Globulin 2.8 2.0 - 4.0 g/dL    A/G Ratio 0.8 (L) 1.0 - 2.4   TROPONIN I, HIGH SENSITIVITY    Specimen: Blood, Venous   Result Value Ref Range    Troponin I, High Sensitivity 19 <77 ng/L    Urinalysis with microscopy without culture    Specimen: Urine, Catheterized - Indwelling   Result Value Ref Range    COLOR, URINALYSIS Yellow     APPEARANCE, URINALYSIS Clear     GLUCOSE, URINALYSIS Negative Negative mg/dL    BILIRUBIN, URINALYSIS Negative Negative    KETONES, URINALYSIS Negative Negative mg/dL    SPECIFIC GRAVITY, URINALYSIS 1.012 1.005 - 1.030    OCCULT BLOOD, URINALYSIS Small (A) Negative    PH, URINALYSIS 5.5 5.0 - 7.0    PROTEIN, URINALYSIS 30 (A) Negative mg/dL    UROBILINOGEN, URINALYSIS 2.0 (A) 0.2, 1.0 mg/dL    NITRITE, URINALYSIS Negative Negative    LEUKOCYTE ESTERASE, URINALYSIS Trace (A) Negative    SQUAMOUS EPITHELIAL, URINALYSIS None Seen None Seen, 1 to 5 /hpf    ERYTHROCYTES, URINALYSIS 6 to 10 (A) None Seen, 1 to 2 /hpf    LEUKOCYTES, URINALYSIS 1 to 5 None Seen, 1 to 5 /hpf    BACTERIA, URINALYSIS Few (A) None Seen /hpf    HYALINE CASTS, URINALYSIS None Seen None Seen, 1 to 5 /lpf    MUCUS Present    COVID/Flu/RSV panel    Specimen: Nasal, Mid-turbinate; Swab   Result Value Ref Range    Rapid SARS-COV-2 by PCR Not Detected Not Detected    Influenza A by PCR Not Detected Not Detected    Influenza B by PCR Not Detected Not Detected    RSV BY PCR Not Detected Not Detected    Isolation Guidelines      Procedural Comment     CBC with Automated Differential (performable only)    Specimen: Blood, Venous   Result Value Ref Range    WBC 9.9 4.2 - 11.0 K/mcL    RBC 5.11 4.50 - 5.90 mil/mcL    HGB 14.4 13.0 - 17.0 g/dL    HCT 43.3 39.0 - 51.0 %    MCV 84.7 78.0 - 100.0 fl    MCH 28.2 26.0 - 34.0 pg    MCHC 33.3 32.0 - 36.5 g/dL    RDW-CV 14.3 11.0 - 15.0 %    RDW-SD 43.9 39.0 - 50.0 fL     140 - 450 K/mcL    NRBC 0 <=0 /100 WBC    Neutrophil, Percent 84 %    Lymphocytes, Percent 8 %    Mono, Percent 7 %    Eosinophils, Percent 0 %    Basophils, Percent 0 %    Immature Granulocytes 1 %    Absolute Neutrophils 8.3 (H) 1.8 - 7.7 K/mcL    Absolute Lymphocytes 0.8 (L) 1.0 - 4.0 K/mcL     Absolute Monocytes 0.7 0.3 - 0.9 K/mcL    Absolute Eosinophils  0.0 0.0 - 0.5 K/mcL    Absolute Basophils 0.0 0.0 - 0.3 K/mcL    Absolute Immature Granulocytes 0.1 0.0 - 0.2 K/mcL   GLUCOSE, BEDSIDE - POINT OF CARE    Specimen: Blood   Result Value Ref Range    GLUCOSE, BEDSIDE - POINT OF CARE 76 70 - 99 mg/dL   GLUCOSE, BEDSIDE - POINT OF CARE    Specimen: Blood   Result Value Ref Range    GLUCOSE, BEDSIDE - POINT OF CARE 58 (L) 70 - 99 mg/dL       Laboratory results above independently reviewed and interpreted by me.    Radiography/Imaging  CT ABDOMEN PELVIS W CONTRAST   Final Result   1.   No acute inflammatory changes or bowel obstruction identified.   2.   Large amount of stool in the rectal vault. Correlate for constipation   or fecal impaction.   3.   Cholelithiasis without evidence of acute cholecystitis.   4.   Small bilateral pleural effusions and adjacent atelectasis.   5.   Cardiomegaly with coronary atherosclerosis and trace pericardial   effusion.   6.   Extensive chronic multifocal renal scarring and parenchymal thinning.   7.   Postoperative changes L4-5 with expected fluid and incision changes.            Electronically Signed by: VICKY MORILLO M.D.    Signed on: 6/20/2025 7:34 PM    Workstation ID: [id number]      XR CHEST PA OR AP 1 VIEW   Final Result   No acute cardiopulmonary findings visualized by single projection x-ray.            Electronically Signed by: YANCI CHARLES M.D.    Signed on: 6/20/2025 4:48 PM    Workstation ID: [id number]          Imaging result above independently reviewed and interpreted by me.    Medical Decision Making including ED Course and Interventions  Assessment:  On initial exam, patient is resting comfortably in bed. The patient is A&O x3 and in no acute distress.    Multiple differential diagnoses were considered for this patient.   Imaging as noted above.   Labs as noted above.    Patient CT scan does not show any acute abnormalities.  There are some  incidental findings which are likely not contributing to the patient's symptoms.  Patient does have large stool burden which may be causing abdominal pressure and discomfort.  Patient denies any significant symptoms at this time.  He is resting comfortably without any other concerns or complaints.  Patient did have transient hypoglycemia which improved with oral agents.  He feels well at this time, will discharge with close outpatient follow-up.    Patient is stable for discharge at this time. The patient and/or guardian is advised follow up with patient's primary care provider within 24-48 hours. Given strict return precautions, the patient and/or guardian is agreeable to plan at this time.    Orders Placed in the ED  Orders Placed This Encounter    XR CHEST PA OR AP 1 VIEW    CT ABDOMEN PELVIS W CONTRAST    CBC with Automated Differential    Comprehensive Metabolic Panel    Urinalysis with microscopy without culture    COVID/Flu/RSV panel    CBC with Automated Differential (performable only)    [place] Draw Light Blue Top Collected? 1 Label; Red Top Collected? No Labels; Light Green Top Collected? No Labels; Lavender Top Collected? No Labels; Gold Top Collected? 1 Label; Pink Top Collected? No Labels; Grey Top Collected? 1 Label    Light Blue Top    Gold Top    Grey Top Tube    Electrocardiogram 12-Lead    GLUCOSE, BEDSIDE - POINT OF CARE    GLUCOSE, BEDSIDE - POINT OF CARE    DISCONTD: sodium chloride 0.9 % injection 10 mL    DISCONTD: sodium chloride 0.9 % injection 2 mL    iohexol (OMNIPAQUE 350 INJECT) contrast solution 100 mL       ED Course as of 06/20/25 2322   Fri Jun 20, 2025   1842 EKG at 1631 demonstrates atrial fibrillation, rate 91, left axis deviation, interventricular conduction delay, no acute ST or T wave changes. [SK]      ED Course User Index  [SK] Braxton White DO     -External records reviewed and documented as above.  -Care affected by social determinants of health as documented above.  -I  personally considered admission/escalation of hospital level care vs discharge vs other disposition from the ED.    Disposition    Prescriptions given or considered    Discharge Medication List as of 6/20/2025  9:00 PM          FINAL CLINICAL IMPRESSION    1. Lower abdominal pain    2. Constipation, unspecified constipation type    3. Hypoglycemia    4. Gallstones    5. Pleural effusion, bilateral        6/20/2025  DO Christopher Barney Shahab, DO  06/20/25 3097     82